# Patient Record
Sex: FEMALE
[De-identification: names, ages, dates, MRNs, and addresses within clinical notes are randomized per-mention and may not be internally consistent; named-entity substitution may affect disease eponyms.]

---

## 2019-02-02 ENCOUNTER — HOSPITAL ENCOUNTER (OUTPATIENT)
Dept: HOSPITAL 92 - ERS | Age: 19
Setting detail: OBSERVATION
LOS: 1 days | Discharge: HOME | End: 2019-02-03
Attending: FAMILY MEDICINE | Admitting: FAMILY MEDICINE
Payer: COMMERCIAL

## 2019-02-02 VITALS — BODY MASS INDEX: 37 KG/M2

## 2019-02-02 DIAGNOSIS — R53.1: Primary | ICD-10-CM

## 2019-02-02 DIAGNOSIS — F41.9: ICD-10-CM

## 2019-02-02 DIAGNOSIS — G35: ICD-10-CM

## 2019-02-02 DIAGNOSIS — Z79.52: ICD-10-CM

## 2019-02-02 DIAGNOSIS — I67.1: ICD-10-CM

## 2019-02-02 DIAGNOSIS — G43.809: ICD-10-CM

## 2019-02-02 LAB
ANION GAP SERPL CALC-SCNC: 13 MMOL/L (ref 10–20)
BASOPHILS # BLD AUTO: 0.1 THOU/UL (ref 0–0.2)
BASOPHILS NFR BLD AUTO: 0.9 % (ref 0–1)
BUN SERPL-MCNC: 9 MG/DL (ref 7–18.7)
CALCIUM SERPL-MCNC: 9.4 MG/DL (ref 7.8–10.44)
CHLORIDE SERPL-SCNC: 106 MMOL/L (ref 98–107)
CO2 SERPL-SCNC: 24 MMOL/L (ref 22–29)
CREAT CL PREDICTED SERPL C-G-VRATE: 0 ML/MIN (ref 70–130)
EOSINOPHIL # BLD AUTO: 0.1 THOU/UL (ref 0–0.7)
EOSINOPHIL NFR BLD AUTO: 0.9 % (ref 0–10)
GLUCOSE SERPL-MCNC: 158 MG/DL (ref 70–105)
HGB BLD-MCNC: 14.3 G/DL (ref 12–16)
LYMPHOCYTES # BLD: 1.6 THOU/UL (ref 1.2–3.4)
LYMPHOCYTES NFR BLD AUTO: 20.4 % (ref 28–48)
MCH RBC QN AUTO: 30.4 PG (ref 25–35)
MCV RBC AUTO: 90.8 FL (ref 78–98)
MONOCYTES # BLD AUTO: 0.2 THOU/UL (ref 0.11–0.59)
MONOCYTES NFR BLD AUTO: 1.9 % (ref 0–4)
NEUTROPHILS # BLD AUTO: 6 THOU/UL (ref 1.4–6.5)
NEUTROPHILS NFR BLD AUTO: 75.9 % (ref 31–61)
PLATELET # BLD AUTO: 266 THOU/UL (ref 130–400)
POTASSIUM SERPL-SCNC: 4 MMOL/L (ref 3.5–5.1)
PREGS CONTROL BACKGROUND?: (no result)
PREGS CONTROL BAR APPEAR?: YES
RBC # BLD AUTO: 4.72 MILL/UL (ref 4–5.2)
SODIUM SERPL-SCNC: 139 MMOL/L (ref 136–145)
WBC # BLD AUTO: 8 THOU/UL (ref 4.8–10.8)

## 2019-02-02 PROCEDURE — 80048 BASIC METABOLIC PNL TOTAL CA: CPT

## 2019-02-02 PROCEDURE — 96372 THER/PROPH/DIAG INJ SC/IM: CPT

## 2019-02-02 PROCEDURE — G0378 HOSPITAL OBSERVATION PER HR: HCPCS

## 2019-02-02 PROCEDURE — 36415 COLL VENOUS BLD VENIPUNCTURE: CPT

## 2019-02-02 PROCEDURE — 96365 THER/PROPH/DIAG IV INF INIT: CPT

## 2019-02-02 PROCEDURE — 84703 CHORIONIC GONADOTROPIN ASSAY: CPT

## 2019-02-02 PROCEDURE — 96366 THER/PROPH/DIAG IV INF ADDON: CPT

## 2019-02-02 PROCEDURE — 85025 COMPLETE CBC W/AUTO DIFF WBC: CPT

## 2019-02-02 PROCEDURE — 99285 EMERGENCY DEPT VISIT HI MDM: CPT

## 2019-02-02 NOTE — PDOC.FPRHP
- History of Present Illness


Chief Complaint: left sided weakness and numbness; trouble walking


History of Present Illness: 








Patient is a 18 year old female with a past history of complex migraines who 

initially presented to [facility] for 2 day history fatigue, left upper 

and extremity weakness/numbness and ataxia. She denies any other neurological 

symptoms. After evaluation at [facility], pt was transferred due to lack of 

availability of neurology for consultation.





She has a prior history of hospitalization for TIA vs. Complex Migraine at 

[facility] in 2015. She states that she had residual left sided 

weakness for several months after that.


ED Course: 


At outside ER, pt received  mg, Ativan 1 mg, Solumedrol 1 g.





- Allergies/Adverse Reactions


 Allergies











Allergy/AdvReac Type Severity Reaction Status Date / Time


 


No Known Allergies Allergy   Verified 02/02/19 05:05














- Home Medications


 











 Medication  Instructions  Recorded  Confirmed  Type


 


No Known  02/02/19 02/02/19 History














- History


PMHx:Complex migraine, Anxiety


 


PSHx: none





FHx: No family history of neurological disease; maternal grandmother with 

history of HTN, Father with DM


 


Social: Pt denies drugs, alcohol, and tobacco use. She is a student at [employer].


 








- Review of Systems


General: reports: fatigue.  denies: fever/chills, weight/appetite/sleep changes


ENT: reports: rhinorrhea.  denies: nasal congestion


Respiratory: denies: shortness of breath


Cardiovascular: denies: chest pain


Gastrointestinal: denies: nausea, vomiting, abdominal pain


Skin: denies: rashes


Musculoskeletal: reports: pain, tenderness


Neurological: reports: numbness, weakness.  denies: syncope, seizure


Psychological: reports: anxiety





- Vital signs


BP: 131/79  HR: 96 RR: 16 Tmax: 98.9 Pox: 96% on RA  Wt: 97 kg





- Physical Exam


Constitutional: NAD, awake, alert and oriented


HEENT: normocephalic and atraumatic, PERRLA, EOMI, grossly normal vision, MMM


Neck: supple


Heart: RRR, normal S1/S2, no murmurs/rubs/gallops, no edema


Lungs: CTAB


Abdomen: soft, non-tender


Neurological: CN II-XII intact, normal sensation, DTRs 2+


-Neurological: 


4/5 strength in left upper and lower extremities. 5/5 in right upper and lower 

extremities


No clonus; negative Babinsnki


some spasticity noted in right upper extremity.





Skin: no rash/lesions


Psychiatric: normal mood and affect





FMR H&P: Results





- Labs


Result Diagrams: 


 02/02/19 02:18





 02/02/19 02:18


Lab results: 


 











WBC  8.0 thou/uL (4.8-10.8)   02/02/19  02:18    


 


Hgb  14.3 g/dL (12.0-16.0)   02/02/19  02:18    


 


Hct  42.8 % (36.0-47.0)   02/02/19  02:18    


 


MCV  90.8 fL (78.0-98.0)   02/02/19  02:18    


 


Plt Count  266 thou/uL (130-400)   02/02/19  02:18    


 


Neutrophils %  75.9 % (31.0-61.0)  H  02/02/19  02:18    


 


Sodium  139 mmol/L (136-145)   02/02/19  02:18    


 


Potassium  4.0 mmol/L (3.5-5.1)   02/02/19  02:18    


 


Chloride  106 mmol/L ()   02/02/19  02:18    


 


Carbon Dioxide  24 mmol/L (22-29)   02/02/19  02:18    


 


BUN  9 mg/dL (7.0-18.7)   02/02/19  02:18    


 


Creatinine  0.76 mg/dL (0.6-1.1)   02/02/19  02:18    


 


Glucose  158 mg/dL ()  H  02/02/19  02:18    


 


Calcium  9.4 mg/dL (7.8-10.44)   02/02/19  02:18    














- Radiology Interpretation


  ** MRI - head


Status: report reviewed by me


Additional comment: 








MRI Brain with and without contrast


- multiple foci of increased T2 white matter signal in the periventricular deep 

white matter bilaterally. There are lesions within the corpus callosum. 

Findings are compatible with demyelinating process, likely multiple sclerosis. 

No enhancing lesions are seen at this time. No evidence of acute infarction or 

acute ischemia. Pansinusitits








MRA of the head without contrast


shows small 2 mm aneurysn along the anterior communicating artery





MRA neck with and without IV contrast


-Right and left common carotids - no hemodynamically significant stenosis


-Right and left internal carotids no hemodynamically significant stenosis


- Right and left vertebral arteries: no hemodynamically significant stenosis. 

No intimal flaps seen. No luminal narrowing.





  ** CT scan - head


Status: report reviewed by me (CT)


Additional comment: 





CT head without contrast


- no acute intracranial findings


-moderate paranasal sinus disease





CTA head and neck


- small dissection flap in left ICA without luminal narrowing.


- otherwise normal CTA of head and neck





FMR H&P: A/P





- Problem List


(1) Multiple sclerosis


Current Visit: Yes   Status: Acute   Code(s): G35 - MULTIPLE SCLEROSIS   





(2) Anxiety


Current Visit: Yes   Status: Acute   Code(s): F41.9 - ANXIETY DISORDER, 

UNSPECIFIED   





(3) Migraine


Current Visit: Yes   Status: Acute   Code(s): G43.909 - MIGRAINE, UNSP, NOT 

INTRACTABLE, WITHOUT STATUS MIGRAINOSUS   





- Plan





Multiple Sclerosis


- will admit patient to medical unit for observation


- new diagnosis, acure flare


- will continue Methylprednisolone 1 g daily with taper.


- likely curbside with neurology in AM.








Anxiety


- pt is not on chronic meds


- will continue to monitor





Migraine


- per patient, mostly controlled with OTC medications.





DVT proph: Lovenox


Disposition/LOS: 





Stable. Length of stay no likely greater than 2 midnights

## 2019-02-02 NOTE — PDOC.FM
- Subjective


Subjective: 


18F, day 1 of hospitalization, her for MS flare. Found at [facility] 2 day prior. 

Transfered here to see neurology. Apparently no neurologist at [facility].





Patient state she feels well today, her weakness in left leg is improving. 

Still has some blurry vision. Specifically denies respiratory distress.





- Objective


MAR Reviewed: Yes


Vital Signs & Weight: 


 Vital Signs (12 hours)











  Temp Pulse Resp BP Pulse Ox


 


 02/02/19 04:55  98.1 F  105 H  16  102/64  96








 Weight











Weight                         94.801 kg














Result Diagrams: 


 02/02/19 02:18





 02/02/19 02:18





Phys Exam





- Physical Examination


Constitutional: NAD


HEENT: moist MMs


Neck: no nodes, supple


Respiratory: no wheezing, no rales, no rhonchi, clear to auscultation bilateral


Cardiovascular: RRR, no significant murmur, no rub


Gastrointestinal: soft, non-tender, no distention, positive bowel sounds


Musculoskeletal: no edema


Neurological: non-focal, moves all 4 limbs


Lymphatic: no nodes


Psychiatric: normal affect


Skin: no rash, cap refill <2 seconds





Dx/Plan


(1) Multiple sclerosis


Code(s): G35 - MULTIPLE SCLEROSIS   Status: Acute   


Plan: 


Currently improving per patient.


Plan to continue steroid, convert to oral today. Consult Neuro for recs.


No respiratory distress. DC plan include having patient walk and confident in 

performing daily activities. 








(2) Anxiety


Code(s): F41.9 - ANXIETY DISORDER, UNSPECIFIED   Status: Acute   


Plan: 


Currently, patient denies anxiety. Advise follow up as outpatient for treatment.








(3) Migraine


Code(s): G43.909 - MIGRAINE, UNSP, NOT INTRACTABLE, WITHOUT STATUS MIGRAINOSUS 

  Status: Acute   


Plan: 


Improved. Patient not having any issue at this moment. Will reevaluate if it 

occurs.

## 2019-02-03 VITALS — SYSTOLIC BLOOD PRESSURE: 106 MMHG | DIASTOLIC BLOOD PRESSURE: 67 MMHG

## 2019-02-03 VITALS — TEMPERATURE: 98.4 F

## 2019-02-03 NOTE — PDOC.FM
- Subjective


Subjective: 





Patient doing well. She reports her strength and blurry vision are all 

improving. She states she has no complaints.





- Objective


MAR Reviewed: Yes


Vital Signs & Weight: 


 Vital Signs (12 hours)











  Temp Pulse Resp BP BP Pulse Ox


 


 02/03/19 07:27  97.7 F  72  19  106/67   95


 


 02/03/19 03:45  97.9 F  81  16   115/70  97


 


 02/03/19 00:30  98.5 F  79  16   133/84  84 L


 


 02/02/19 19:50  98.3 F  83  16   126/77  97








 Weight











Weight                         94.801 kg














I&O: 


 











 02/02/19 02/03/19 02/04/19





 06:59 06:59 06:59


 


Intake Total  250 


 


Balance  250 











Result Diagrams: 


 02/02/19 02:18





 02/02/19 02:18





Phys Exam





- Physical Examination


Constitutional: NAD


HEENT: moist MMs


Neck: no nodes, supple


Respiratory: no wheezing, no rales, no rhonchi, clear to auscultation bilateral


Cardiovascular: RRR, no significant murmur, no rub


Gastrointestinal: soft, non-tender, no distention, positive bowel sounds


Musculoskeletal: no edema


Neurological: non-focal, moves all 4 limbs


5/5 strength in upper and LE


Lymphatic: no nodes


Psychiatric: normal affect, A&O x 3


Skin: no rash





Dx/Plan


(1) Multiple sclerosis


Code(s): G35 - MULTIPLE SCLEROSIS   Status: Acute   


Plan: 


Currently improving per patient.


Plan to continue steroid, convert to oral today. Consult Neuro for recs. Neuro 

indicated yesterday they will decided today for LP or not


No respiratory distress. DC plan include having patient walk and confident in 

performing daily activities. 








(2) Anxiety


Code(s): F41.9 - ANXIETY DISORDER, UNSPECIFIED   Status: Acute   


Plan: 


Currently, patient denies anxiety and no overnight event. Advise follow up as 

outpatient for treatment.








(3) Migraine


Code(s): G43.909 - MIGRAINE, UNSP, NOT INTRACTABLE, WITHOUT STATUS MIGRAINOSUS 

  Status: Acute   


Plan: 


Improved. Patient not having any issue at this moment and has not recurred/. 

Will reevaluate if it occurs.

## 2019-02-03 NOTE — CON
DATE OF CONSULTATION:  02/03/2019



NEUROLOGY CONSULTATION



CONSULTING PHYSICIAN:  [facility].



IMPRESSION:  

1. Probable multiple sclerosis.

2. 2 mm right anterior communicating aneurysm.

3. Small left internal carotid dissection.



PLAN:  

1. IV Solu-Medrol 1 g for 3 days.

2. Medrol Dosepak.

3. Office followup.



HISTORY OF PRESENT ILLNESS:  Ms. Obrien is a 18-year-old  female, college

student, who came in with complaints of recurrent left-sided weakness.  She reports

that when she was 16, she had an episode of left-sided weakness that lasted about 2

weeks.  She was evaluated in [place] and told that she had had a stroke.  She was

started on aspirin, but developed nosebleeds and discontinued it.  Since then, she

has been relatively stable.  She has had some problems with panic and anxiety.  She

has had a tremor in her hands for the last several months.  She came in this time

with recurrent complaints of left-sided weakness and tendency to drag her left leg.

She has also noted some blurred vision in the left eye.  There is some minimal

ongoing headache.  She has a history of migraines also.  She has been started on

steroids.  She was initially worked up at [facility] and had multiple scans

done.  Due to apparent lack of Neurology cover, they shipped her here. 



PAST MEDICAL HISTORY:  Otherwise, negative other than the event 2 years ago.



FAMILY HISTORY:  Unremarkable.



SOCIAL HISTORY:  No tobacco or illicit drug use.



MEDICATIONS:  None.



REVIEW OF SYSTEMS:  Ten-system review of systems is otherwise negative.



PHYSICAL EXAMINATION:

GENERAL:  She is a slightly overweight young woman, in no distress. 

VITAL SIGNS:  Stable.  She is afebrile. 

HEENT:  Pupils are equal and reactive.  Conjunctivae clear.  Oropharynx clear.

Visual acuity is subjectively mildly decreased in the left eye.  There is no color

desaturation reported. 

NECK:  Supple.  No lymphadenopathy. 

EXTREMITIES:  No cyanosis, clubbing, or edema. 

NEUROLOGIC:  She is alert and appropriate.  Her speech is fluent and clear.  Cranial

nerves 2 through 12 are intact.  Otherwise, motor exam showed good strength

bilaterally.  Sensation was intact to light touch.  She had a postural tremor in

both hands.  She can walk independently. 



LABORATORY DATA:  Laboratory studies were reviewed.



SUMMARY:  This is a young woman with apparent multiple white matter lesions reported

on her MRI suggesting multiple sclerosis. 



Imaging was not available for review.  There is some minor endovascular

abnormalities as reported above that probably are insignificant.  I would finish her

steroid treatment and I will follow up with her to start a disease modifying agent

to address her multiple sclerosis. 







Job ID:  [id number]

## 2019-02-04 NOTE — PRG
DATE OF SERVICE:  02/03/2019



ADDENDUM:  Please see note from Dr. Jorge Luis Reynoso, for which I agree.  The patient was

seen, evaluated, examined, and discussed with the residents.  Basically doing a lot

better.  Neurologically starting to get some of her strength back in her left arm,

even through it is not 100%, left leg is lot better as well.  Has been on IV

steroids, and we discussed this with Dr. Howard, Neurology, yesterday and he wants

to see her as an outpatient and feels like since she gets her 3rd day of steroids

she can go and will follow up with him.  The patient asked us about some kind of CT

finding in her neck at [place] [facility] [place], we were able to galvan down those old medical

records, and a CTA showed a questionable dissection flap that is how is described on

the CTA, but then was not seen on MRA on the left side.  Certainly, does not

describe an actual dissection or anything that extreme.  So, it does not sound like

this is something that is acutely major problem, but can run this by Neurology as an

outpatient to get their opinion, if we just need to keep following this. 







Job ID:  [id number]

## 2019-02-04 NOTE — DIS
DATE OF ADMISSION:  02/02/2019



DATE OF DISCHARGE:  02/03/2019



ADMITTING ATTENDING 

Dr. Branden Monroe 



DISCHARGE ATTENDING 

Dr. Branden Monroe



RESIDENT PHYSICIAN

Dr. Jorge Luis Reynoso



CONSULTS:  Dr. Sunny Howard, Neurology.



PROCEDURES:  None.



PRIMARY DIAGNOSES:  

1. Probable multiple sclerosis.

2. A 2-mm right anterior communicating aneurysm.

3. Small left internal carotid dissection.



DISCHARGE MEDICATIONS:  None.



HISTORY OF PRESENT ILLNESS:  Ms. Obrien is a 18-year-old female who was seen

initially at [facility] for recurrent left-sided weakness and 
blurry

vision. She had an MRI done at [facility] where it was found that she has

finding concerning for multiple sclerosis. In addition a CT of her carotid was 
done,

finding a 2-mm right anterior communicating aneurysm and small left internal 
carotid

dissection. She was then transferred to [facility] due to apparent 
lack of

Neurology at [facility]. During her stay here, she was started on 1 gm of

methylprednisolone Q. daily. During her stay, her symptoms gradually resolved. 
On

the 3rd day of treatment, Dr. Sunny Howard saw her. His recommendation was that 
she

follow up in clinic with him for further workup on multiple sclerosis and that 
the small aneurysm and dissection was not of significance but she should also 
follow up with him for that.



DISPOSITION:  Stable.



DISCHARGE INSTRUCTIONS:  Location: To home. 



Diet: As tolerated. 



Activity: As tolerated. 



Followup: Follow up with Dr. Sunny Howard within 2 weeks.







Job ID:  [id number]



MTDD

## 2019-02-04 NOTE — HP
ADDENDUM:  

Please see the history and physical done by Dr. Bee Sousa, for which I agree as

well as the progress note from Dr. Reynoso, for which I agree.  The patient was seen,

evaluated, and discussed with the residents by bedside. 



HISTORY OF PRESENT ILLNESS:  This is a 18-year-old, who comes in with left-sided

weakness, both arm and leg.  Does have history of complicated complex migraines in

the past, but this seems more extreme for even some problems walking and MRI shows

likely multiple foci of increased T2 white matter signal in the periventricular deep

white matter bilaterally compatible with demyelinating processes like a mass and so

is here for that possibility.  She has been given IV Solu-Medrol and her responding

seems to be little better as far as her weakness on the left side. 



PAST MEDICAL HISTORY:  Per the history and physical that was reviewed.



PAST SURGICAL HISTORY:  Per the history and physical that was reviewed.



MEDICATIONS:  Per the history and physical that was reviewed.



ALLERGIES:  PER THE HISTORY AND PHYSICAL THAT WAS REVIEWED.



REVIEW OF SYSTEMS:  Per the history and physical that was reviewed.



PHYSICAL EXAMINATION:

GENERAL:  Exam is completely normal. 

VITAL SIGNS:  Stable.  Affect is normal.  I do not really appreciate any left facial

defects.  Left arm and hand seem maybe detail just a little bit weak compared to the

right and the left leg also has a slightly weak compared to the right. 



ASSESSMENT AND PLAN:  Multiple sclerosis, likely redness by Dr. Howard neurology

on-call and he recommends IV steroids for about 3 days and then outpatient workup

for multiple sclerosis while she is on a Medrol taper.  Discussed this with the

patient. 







Job ID:  [id number]

## 2020-10-30 ENCOUNTER — HOSPITAL ENCOUNTER (INPATIENT)
Dept: HOSPITAL 92 - ERS | Age: 20
LOS: 2 days | Discharge: HOME | DRG: 60 | End: 2020-11-01
Attending: FAMILY MEDICINE | Admitting: FAMILY MEDICINE
Payer: SELF-PAY

## 2020-10-30 VITALS — BODY MASS INDEX: 38 KG/M2

## 2020-10-30 DIAGNOSIS — T38.0X5A: ICD-10-CM

## 2020-10-30 DIAGNOSIS — Z23: ICD-10-CM

## 2020-10-30 DIAGNOSIS — G35: Primary | ICD-10-CM

## 2020-10-30 DIAGNOSIS — Z88.8: ICD-10-CM

## 2020-10-30 DIAGNOSIS — F41.9: ICD-10-CM

## 2020-10-30 DIAGNOSIS — G43.909: ICD-10-CM

## 2020-10-30 DIAGNOSIS — R73.9: ICD-10-CM

## 2020-10-30 LAB
ALBUMIN SERPL BCG-MCNC: 4.2 G/DL (ref 3.5–5)
ALP SERPL-CCNC: 80 U/L (ref 40–110)
ALT SERPL W P-5'-P-CCNC: 14 U/L (ref 8–55)
ANION GAP SERPL CALC-SCNC: 13 MMOL/L (ref 10–20)
AST SERPL-CCNC: 12 U/L (ref 5–34)
BASOPHILS # BLD AUTO: 0.1 THOU/UL (ref 0–0.2)
BASOPHILS NFR BLD AUTO: 1 % (ref 0–1)
BILIRUB SERPL-MCNC: 1.2 MG/DL (ref 0.2–1.2)
BUN SERPL-MCNC: 15 MG/DL (ref 7–18.7)
CALCIUM SERPL-MCNC: 9.1 MG/DL (ref 7.8–10.44)
CHLORIDE SERPL-SCNC: 104 MMOL/L (ref 98–107)
CK SERPL-CCNC: 99 U/L (ref 29–168)
CO2 SERPL-SCNC: 24 MMOL/L (ref 22–29)
CREAT CL PREDICTED SERPL C-G-VRATE: 0 ML/MIN (ref 70–130)
EOSINOPHIL # BLD AUTO: 0.3 THOU/UL (ref 0–0.7)
EOSINOPHIL NFR BLD AUTO: 3 % (ref 0–10)
GLOBULIN SER CALC-MCNC: 2.7 G/DL (ref 2.4–3.5)
GLUCOSE SERPL-MCNC: 126 MG/DL (ref 70–105)
HGB BLD-MCNC: 15.4 G/DL (ref 12–16)
LYMPHOCYTES # BLD: 2.5 THOU/UL (ref 1.2–3.4)
LYMPHOCYTES NFR BLD AUTO: 26.6 % (ref 21–51)
MCH RBC QN AUTO: 32.2 PG (ref 27–31)
MCV RBC AUTO: 91.5 FL (ref 78–98)
MONOCYTES # BLD AUTO: 0.4 THOU/UL (ref 0.11–0.59)
MONOCYTES NFR BLD AUTO: 4.3 % (ref 0–10)
NEUTROPHILS # BLD AUTO: 6 THOU/UL (ref 1.4–6.5)
NEUTROPHILS NFR BLD AUTO: 65.2 % (ref 42–75)
PLATELET # BLD AUTO: 231 THOU/UL (ref 130–400)
POTASSIUM SERPL-SCNC: 3.9 MMOL/L (ref 3.5–5.1)
PREGS CONTROL BACKGROUND?: (no result)
PREGS CONTROL BAR APPEAR?: YES
RBC # BLD AUTO: 4.79 MILL/UL (ref 4.2–5.4)
SODIUM SERPL-SCNC: 137 MMOL/L (ref 136–145)
WBC # BLD AUTO: 9.3 THOU/UL (ref 4.8–10.8)

## 2020-10-30 PROCEDURE — A9579 GAD-BASE MR CONTRAST NOS,1ML: HCPCS

## 2020-10-30 PROCEDURE — 85025 COMPLETE CBC W/AUTO DIFF WBC: CPT

## 2020-10-30 PROCEDURE — 83036 HEMOGLOBIN GLYCOSYLATED A1C: CPT

## 2020-10-30 PROCEDURE — 70450 CT HEAD/BRAIN W/O DYE: CPT

## 2020-10-30 PROCEDURE — 82550 ASSAY OF CK (CPK): CPT

## 2020-10-30 PROCEDURE — 90662 IIV NO PRSV INCREASED AG IM: CPT

## 2020-10-30 PROCEDURE — 96365 THER/PROPH/DIAG IV INF INIT: CPT

## 2020-10-30 PROCEDURE — 90471 IMMUNIZATION ADMIN: CPT

## 2020-10-30 PROCEDURE — G0008 ADMIN INFLUENZA VIRUS VAC: HCPCS

## 2020-10-30 PROCEDURE — 84703 CHORIONIC GONADOTROPIN ASSAY: CPT

## 2020-10-30 PROCEDURE — 93010 ELECTROCARDIOGRAM REPORT: CPT

## 2020-10-30 PROCEDURE — 80053 COMPREHEN METABOLIC PANEL: CPT

## 2020-10-30 PROCEDURE — 96375 TX/PRO/DX INJ NEW DRUG ADDON: CPT

## 2020-10-30 PROCEDURE — 36416 COLLJ CAPILLARY BLOOD SPEC: CPT

## 2020-10-30 PROCEDURE — 70553 MRI BRAIN STEM W/O & W/DYE: CPT

## 2020-10-30 PROCEDURE — 93005 ELECTROCARDIOGRAM TRACING: CPT

## 2020-10-30 PROCEDURE — 36415 COLL VENOUS BLD VENIPUNCTURE: CPT

## 2020-10-30 RX ADMIN — ACETAMINOPHEN, ASPIRIN, CAFFEINE PRN TAB: 250; 65; 250 TABLET, FILM COATED ORAL at 20:31

## 2020-10-30 RX ADMIN — INSULIN LISPRO PRN UNIT: 100 INJECTION, SOLUTION INTRAVENOUS; SUBCUTANEOUS at 20:27

## 2020-10-30 NOTE — CON
NEUROLOGY CONSULTATION



DATE OF CONSULTATION:  10/30/2020



REASON FOR CONSULTATION:  Multiple sclerosis exacerbation.



HISTORY OF PRESENT ILLNESS:  Ms. Maddi Obrien is a 20-year-old female with

history significant for multiple sclerosis, not on any immune modulating agent 
due

to lack of insurance, came to the hospital from a health clinic at a [facility] 
because

of left leg weakness since the last 2 days.  Per the patient, she has difficulty

walking and has been limping.  She also has decreased sensation up to the knee 
of

the left leg.  She also complained of severe migraine with worst pain behind the

right eye, for which she has been taking over-the-counter Excedrin.  She also 
claims

to be very nauseous and did have vomiting.  The patient has been seen by a

neurologist by Dr. Howard as outpatient, but since she lost her insurance, she 
has

not seen a neurologist for more than a year.  Currently, she cannot afford her

medications.  The patient denies double vision, but she does report pain on 
moving

the eyes and also transient loss of vision in the right eye yesterday, which

resolved on its own in the setting of severe headache.  The patient reports 
nausea,

vomiting, headache, episode of loss of vision, and pain on moving the eyes, but

denies problems with speech or swallowing, urinary retention, or urinary

incontinence. 



REVIEW OF SYSTEMS:  All systems were reviewed and were negative except the 
pertinent

positives and negatives mentioned in the HPI. 



PAST MEDICAL HISTORY:  

1. Migraines.

2. Multiple sclerosis.

3. Anxiety.



PAST SURGICAL HISTORY:  None.



SOCIAL HISTORY:  Drinks socially twice a month.  Denies smoking or illegal drug 
use.



FAMILY HISTORY:  No family history of MS.



ALLERGIES:NKDA



 Vital signs

BP: 111/60, HR 70, RR 16, Temp 98.2F, O2 100% on RA, wt 100kg







PHYSICAL EXAMINATION:

Constitutional: NAD, awake, alert and oriented

HEENT: normocephalic and atraumatic, EOMI, no scleral icterus, grossly normal 
vision, grossly normal hearing, MMM

Neck: supple, FROM

Heart: RRR, normal S1/S2, no murmurs/rubs/gallops, pulses present, no edema

Lungs: CTAB, no respiratory distress, no wheezing

Abdomen: soft, non-tender

Musculoskeletal: normal structure, normal tone

Neurological:  Mental status; the patient is alert and oriented to person, 
place, and

time.  Speech is clear.  Recent and remote memory intact.  Fund of knowledge is

appropriate.  Motor; muscle tone and bulk are normal, strength in upper 
extremity

5/5, intention tremor in the right upper extremity, and left lower extremity 
3/5,

right lower extremity 5/5.  Sensory; decreased sensation to light touch in the 
left

leg up to the knee.  Rest is intact.  Gait, deferred due to patient's safety

reasons.  Cerebellar, finger-nose testing intact. 





DATA REVIEWED:  I reviewed the CT scan, which was negative for acute 
intracranial

pathology. 



 

Lab results: 

                                        







WBC  9.3 thou/uL (4.8-10.8)   10/30/20  10:20    

 

Hgb  15.4 g/dL (12.0-16.0)   10/30/20  10:20    

 

Hct  43.8 % (36.0-47.0)   10/30/20  10:20    

 

MCV  91.5 fL (78.0-98.0)   10/30/20  10:20    

 

Plt Count  231 thou/uL (130-400)   10/30/20  10:20    

 

Neutrophils %  65.2 % (42.0-75.0)   10/30/20  10:20    

 

Sodium  137 mmol/L (136-145)   10/30/20  10:20    

 

Potassium  3.9 mmol/L (3.5-5.1)   10/30/20  10:20    

 

Chloride  104 mmol/L ()   10/30/20  10:20    

 

Carbon Dioxide  24 mmol/L (22-29)   10/30/20  10:20    

 

BUN  15 mg/dL (7.0-18.7)   10/30/20  10:20    

 

Creatinine  0.78 mg/dL (0.6-1.1)   10/30/20  10:20    

 

Glucose  126 mg/dL ()  H  10/30/20  10:20    

 

Calcium  9.1 mg/dL (7.8-10.44)   10/30/20  10:20    

 

Total Bilirubin  1.2 mg/dL (0.2-1.2)   10/30/20  10:20    

 

AST  12 U/L (5-34)   10/30/20  10:20    

 

ALT  14 U/L (8-55)   10/30/20  10:20    

 

Alkaline Phosphatase  80 U/L ()   10/30/20  10:20    

 

Creatine Kinase  99 U/L ()   10/30/20  10:20    

 

Serum Total Protein  6.9 g/dL (6.0-8.3)   10/30/20  10:20    

 

Albumin  4.2 g/dL (3.5-5.0)   10/30/20  10:20    









- Radiology Interpretation

  ** CT scan - head

Status: report reviewed by me



  



 



ASSESSMENT AND PLAN:  Ms. Maddi Obrien is a 20-year-old female with history

significant for MS, not on any long-term immunomodulating agent due to 
insurance, presented with

multiple sclerosis exacerbation.  Start on Solu-Medrol 1 g IV every 24 hours for
3-5

days.  Regular insulin sliding scale for steroid-induced hyperglycemia.  Ppi for

steroid-induced gastroesophageal reflux disease.  Neuro checks every 4 hours.  
MRI

of the brain with and without contrast to assess for new active demyelinating

lesions.  PT/OT/Speech.  Continue medical management per primary team.  The plan

discussed in detail with the ED physician and the patient.  We will continue to

follow. 



Thank you for the consult.







Job ID:  [id number]



MARISEL

## 2020-10-30 NOTE — CT
CT HEAD WITHOUT IV CONTRAST



COMPARISON:

 None



HISTORY:

 Altered mental status. History of multiple sclerosis. Left leg weakness and increased headaches with
 nausea.



TECHNIQUE: Axial CT imaging at 5 mm intervals from vertex through skull base without contrast



FINDINGS:

There is no evidence of an acute infarction, hemorrhage, mass effect, or midline shift. The ventricul
ar system is normal in size, shape, and position. 



Skull base has a normal CT appearance.

Mucosal thickening is seen scattered within the ethmoidal air cells bilaterally with trace mucosal th
ickening in the left sphenoid sinus. Mastoid air cells are clear.



Osseous structures appear intact.



IMPRESSION:

1. No acute intracranial abnormality demonstrated.

2. Patient has reported history of multiple sclerosis. MRI brain would be a more sensitive study of tamara
amol for evaluation of demyelinating plaques.



Reported By: Julio Gipson 

Electronically Signed:  10/30/2020 10:45 AM

## 2020-10-30 NOTE — PDOC.FPRHP
- History of Present Illness


Chief Complaint: dizziness and nausea


History of Present Illness: 





Pt is a 21yo female with hx of MS and migraines who presents with nausea, 

dizziness and weakness. On Wednesday started to experience a migraine headache, 

had transient loss of vision in her right eye and had left leg weakness. Left 

leg has "felt heavy" and she was walking with a limp. Yesterday started to have 

nausea, emesis, fatigue, and dizziness. Went to [facility] today for 

dizziness and weakness and was told to go to ED. She was diagnosed with MS in 

Feb 2019. Had been seeing Dr. Howard, neurologist, until about 1 year ago when 

she lost her insurance. She has not been taking medications since then. Denies 

LOC, slurred speech, fever, cough, congestion, SOB.


ED Course: 


1L NS, 1000mg methylprednisolone, benadryl 25mg, reglan 10mg, 1g tylenol








- Allergies/Adverse Reactions


                                    Allergies











Allergy/AdvReac Type Severity Reaction Status Date / Time


 


No Known Allergies Allergy   Verified 02/02/19 05:05














- Home Medications


                                        











 Medication  Instructions  Recorded  Confirmed  Type


 


Aspirin/Acetaminophen/Caffeine 1 each PO Q6HR PRN 10/30/20 10/30/20 History





[Excedrin Migraine Caplet]    














- History


PMHx: MS, migraine w/ aura, anxiety


 


PSHx: none





FHx: none


 


Social: non-smoker, minimal alcohol use, no drug use


 








- Review of Systems


General: denies: fever/chills, weight/appetite/sleep changes


Eyes: reports: vision changes


ENT: denies: nasal congestion, rhinorrhea


Respiratory: denies: cough, congestion, shortness of breath


Cardiovascular: reports: palpitation.  denies: chest pain


Gastrointestinal: reports: nausea, vomiting, diarrhea


Genitourinary: denies: dysuria, polyuria


Skin: denies: rashes, lesions


Musculoskeletal: denies: pain, tenderness


Neurological: reports: numbness, weakness


Psychological: reports: anxiety.  denies: depression





- Vital signs


BP: 111/60, HR 70, RR 16, Temp 98.2F, O2 100% on RA, wt 100kg








- Physical Exam


Constitutional: NAD, awake, alert and oriented


HEENT: normocephalic and atraumatic, EOMI, no scleral icterus, grossly normal 

vision, grossly normal hearing, MMM


Neck: supple, FROM


Heart: RRR, normal S1/S2, no murmurs/rubs/gallops, pulses present, no edema


Lungs: CTAB, no respiratory distress, no wheezing


Abdomen: soft, non-tender


Musculoskeletal: normal structure, normal tone


Neurological: CN II-XII intact


-Neurological: 


 Left leg: decreased sensation, motor 4/5. RUE intention tremor





Skin: no rash/lesions, no jaundice


Heme/Lymphatic: no unusual bruising or bleeding


Psychiatric: normal mood and affect, good judgment and insight, intact recent 

and remote memory





FMR H&P: Results





- Labs


Result Diagrams: 


                                 10/30/20 10:20





                                 10/30/20 10:20


Lab results: 


                                        











WBC  9.3 thou/uL (4.8-10.8)   10/30/20  10:20    


 


Hgb  15.4 g/dL (12.0-16.0)   10/30/20  10:20    


 


Hct  43.8 % (36.0-47.0)   10/30/20  10:20    


 


MCV  91.5 fL (78.0-98.0)   10/30/20  10:20    


 


Plt Count  231 thou/uL (130-400)   10/30/20  10:20    


 


Neutrophils %  65.2 % (42.0-75.0)   10/30/20  10:20    


 


Sodium  137 mmol/L (136-145)   10/30/20  10:20    


 


Potassium  3.9 mmol/L (3.5-5.1)   10/30/20  10:20    


 


Chloride  104 mmol/L ()   10/30/20  10:20    


 


Carbon Dioxide  24 mmol/L (22-29)   10/30/20  10:20    


 


BUN  15 mg/dL (7.0-18.7)   10/30/20  10:20    


 


Creatinine  0.78 mg/dL (0.6-1.1)   10/30/20  10:20    


 


Glucose  126 mg/dL ()  H  10/30/20  10:20    


 


Calcium  9.1 mg/dL (7.8-10.44)   10/30/20  10:20    


 


Total Bilirubin  1.2 mg/dL (0.2-1.2)   10/30/20  10:20    


 


AST  12 U/L (5-34)   10/30/20  10:20    


 


ALT  14 U/L (8-55)   10/30/20  10:20    


 


Alkaline Phosphatase  80 U/L ()   10/30/20  10:20    


 


Creatine Kinase  99 U/L ()   10/30/20  10:20    


 


Serum Total Protein  6.9 g/dL (6.0-8.3)   10/30/20  10:20    


 


Albumin  4.2 g/dL (3.5-5.0)   10/30/20  10:20    














- Radiology Interpretation


  ** CT scan - head


Status: report reviewed by me





  ** MRI - head


Status: image reviewed by me, report reviewed by me





FMR H&P: A/P





- Plan





21yo w/ hx of MS presents with HA, N/V and weakness of L leg





#Acute exacerbation of MS


-dx of MS, currently not treated due to lack of insurance


-Brain CT: no acute process, pending MRI


-neurology consulted from ED: start 1000mg methylprednisolone


- consulted for help with cost of outpatient treatment for MS





#Migraine


-received HA cocktail in ED


-takes excedrin migraine at home


-tylenol prn, excedrin migraine prn





#nausea/vomiting


-zofran prn


-diet as tolerated


-consider IVF if not able to tolerate po





#Anxiety


-aware, patient not on medication





PCP: none


Code: Full


Diet: Regular


IVF: SL


DVT ppx: lovenox





Dispo: Admit inpatient medical, neurology consulted, appreciate recs. LOS >48hrs

















FMR H&P: Upper Level





- Pertinent history





Pt is a 21 yo female with PMH significant for MS, migraines with aura, small 

left interal carotid dissection, 2 mm anterior communicating aneurysm who 

presents with neurological deficits. 2 days ago she had a migraine with sudden 

vision loss but currently does not have vision changes. She has sensory/motor 

changes to L thigh. She denies any other deficits at this time. She endorse n/v 

for the last 2 days as well.  She was diagnosed in 2/2019 and treated until 

10/2019. Has not been on treatment due to lack of insurance. She thinks she was 

treated with tysabri.





Vitals: BP: 111/60, HR 70, RR 16, Temp 98.2F, O2 100% on RA, wt 100kg





Pertinent Physical Exam: 4/5 LLE, decreased sensation to L thigh, CN II-XII 

intact, DAVID





A/P:


# Multiple Sclerosis 


-Neurology consulted in ED


-Pt seen previously by Dr. Howard but pt could not afford medications with lack

of insurance  CM consulted.


-Methylprednisilone 1g IV daily for 3-7 days





# N/V


-Monitor PO intake and start fluids if Zofran does not alleviate nausea 





# Hx of 2 mm R anterior communicating aneurysm


-No evidence of rupture on CT; Follow up in outpt 





# Small Left Internal Carotid Dissection


-Follow up in outpatient





# Hyperglycemia


-Monitor with accuchecks in the setting of high dose steroid use





# Anxiety


-Monitor





VTE: Lovenox


Diet: Regular  change to carb consistent if needed


Fluids: PO


Code: Full


Dispo: Admit to neuro unit for MS Flare, > 48 hrs








- Plan


Date/Time: 10/30/20 1144








I, Aston Preston, have evaluated this patient and agree with findings/plan as 

outlined by intern resident. Pertinent changes/additions are listed here.








Addendum - Attending





- Attending Attestation


Date/Time: 10/30/20 1400





I personally evaluated the patient and discussed the management with Dr. Thompson/Mohan. 


I agree with the History, Examination, Assessment and Plan documented above with

any addition or exceptions noted below.





Patient with history of MS here with suggested MS flare. Neuro consult. MRI read

pending but does appear to have active inflammatory plaques on my review. High 

dose steroids. PT. Anticipate several day hospitalization.

## 2020-10-30 NOTE — MRI
MRI OF THE BRAIN WITH AND WITHOUT IV CONTRAST:

10/30/20

 

HISTORY: 

Altered mental status. Multiple sclerosis. 

 

FINDINGS: 

There are multiple foci of  T2 prolongation in the periventricular subcortical white matter consisten
t with history of multiple sclerosis. There is an enhancing 6 mm plaque involving the subcortical whi
te matter of the left posterior frontal region. No infarct, hemorrhage, mass, midline shift or abnorm
al extra-axial fluid collections are seen. The ventricular size is normal and the basilar cisterns pa
tent. There is mucosal disease in the paranasal sinuses. 

 

IMPRESSION: 

The findings are consistent with multiple sclerosis with a focal actively demyelinating plaque in the
 left posterior frontal lobe. 

 

POS: AH

## 2020-10-31 RX ADMIN — ACETAMINOPHEN, ASPIRIN, CAFFEINE PRN TAB: 250; 65; 250 TABLET, FILM COATED ORAL at 13:40

## 2020-10-31 RX ADMIN — INSULIN LISPRO PRN UNIT: 100 INJECTION, SOLUTION INTRAVENOUS; SUBCUTANEOUS at 12:52

## 2020-10-31 RX ADMIN — METHYLPREDNISOLONE SODIUM SUCCINATE SCH MLS: 1 INJECTION, POWDER, FOR SOLUTION INTRAMUSCULAR; INTRAVENOUS at 08:50

## 2020-10-31 RX ADMIN — INSULIN LISPRO PRN UNIT: 100 INJECTION, SOLUTION INTRAVENOUS; SUBCUTANEOUS at 17:40

## 2020-10-31 RX ADMIN — INSULIN LISPRO PRN UNIT: 100 INJECTION, SOLUTION INTRAVENOUS; SUBCUTANEOUS at 20:05

## 2020-10-31 NOTE — PDOC.NEUPN
- Subjective


Encounter Date: 10/31/20


Subjective: 


Patient feels much better today.  She was able to walk with the help of the 

physical therapist





- Objective


Vital Signs & Weight: 


                             Vital Signs (12 hours)











  Temp Pulse Resp BP BP BP Pulse Ox


 


 10/31/20 12:00  98.0 F  80  20   133/78   96


 


 10/31/20 09:25     112/76   


 


 10/31/20 08:00        98


 


 10/31/20 07:46  98.2 F  62  20   104/67   98


 


 10/31/20 04:00  98.1 F  71  20    124/75  97








                                     Weight











Admit Weight                   222 lb


 


Weight                         222 lb














I&O: 


                                        











 10/30/20 10/31/20 11/01/20





 06:59 06:59 05:59


 


Intake Total  340 


 


Balance  340 











Result Diagrams: 


                                 10/30/20 10:20





                                 10/30/20 10:20


Additional Labs: 


                                   Accuchecks











  10/31/20 10/31/20 10/30/20





  11:56 04:23 19:40


 


POC Glucose  219 H  146 H  237 H











Radiology Reviewed by me: Yes


EKG Reviewed by me: Yes





ROS





- Review of Systems


Constitutional: denies: fever, chills, sweats, weakness, malaise, other


Eyes: reports: pain


ENT: denies: ear pain, ear discharge, nose pain, nose discharge, nose 

congestion, mouth pain, mouth swelling, throat pain, throat swelling, other


Respiratory: denies: cough, dry, shortness of breath, hemoptysis, SOB with 

excertion, pleuritic pain, sputum, wheezing, other


Cardiovascular: denies: no pertinent history, AFIB, CAD, CHF, HTN, MI, Syncope, 

Hyperlipidemia, Mitral valve stenosis, Aortic stenosis, Valve insufficiency, 

Pulmonary hypertension, Other


Gastrointestinal: denies: nausea, vomiting, abdominal pain, diarrhea, 

constipation, melena, hematochezia, other


Genitourinary: denies: dysuria, frequency, incontinence, hematuria, retention, 

other


Musculoskeletal: reports: leg pain, foot pain.  denies: neck pain, shoulder 

pain, arm pain, back pain, hand pain, other


Skin: denies: rash, lesions, curtis, bruising, other


Neurological: reports: weakness.  denies: numbness, incoordination, change in 

speech, confusion, seizures, other


All Systems: All other systems reviewed; all pertinent +/- noted in HPI/Subj





- Medication


Medications: 


Active Medications











Generic Name Dose Route Start Last Admin





  Trade Name Freq  PRN Reason Stop Dose Admin


 


Acetaminophen/Aspirin/Caffeine  1 tab  10/30/20 13:31  10/31/20 13:40





  Aspirin/Apap/Caffeine Tab (Excedrin Migraine)  PO   1 tab





  Q6H PRN   Administration





  Headache  


 


Enoxaparin Sodium  40 mg  10/31/20 09:00  10/31/20 08:50





  Enoxaparin Sodium 40 Mg/0.4 Ml Syringe  SC   40 mg





  0900 JESSY   Administration


 


Methylprednisolone Sodium  116 mls @ 116 mls/hr  10/31/20 09:00  10/31/20 08:50





Succinate 1 gm/ Sodium  IVPB   116 mls





Chloride  DAILY JESSY   Administration


 


Insulin Human Lispro  0 units  10/30/20 20:04  10/31/20 12:52





  Humalog 300 Units/3 Ml Vial  SC   3 unit





  .MILD SLIDING SCALE PRN   Administration





  Mild Correctional Scale  


 


Insulin Human Lispro  0 units  10/30/20 20:04  10/30/20 20:27





  Humalog 300 Units/3 Ml Vial  SC   2 unit





  .BEDTIME SLIDING SC PRN   Administration





  Bedtime Correctional Scale  


 


Ketorolac Tromethamine  15 mg  10/31/20 13:30  10/31/20 13:41





  Ketorolac Tromethamine 30 Mg/Ml Vial  IVP  10/31/20 15:30  15 mg





  NOW JESSY   Administration


 


Pantoprazole Sodium  40 mg  10/31/20 09:00  10/31/20 08:50





  Pantoprazole 40 Mg Tab  PO   40 mg





  DAILY JESSY   Administration














Results





- Labs


Result Diagrams: 


                                 10/30/20 10:20





                                 10/30/20 10:20


Lab results: 


                                        











WBC  9.3 thou/uL (4.8-10.8)   10/30/20  10:20    


 


Hgb  15.4 g/dL (12.0-16.0)   10/30/20  10:20    


 


Hct  43.8 % (36.0-47.0)   10/30/20  10:20    


 


MCV  91.5 fL (78.0-98.0)   10/30/20  10:20    


 


Plt Count  231 thou/uL (130-400)   10/30/20  10:20    


 


Neutrophils %  65.2 % (42.0-75.0)   10/30/20  10:20    


 


Sodium  137 mmol/L (136-145)   10/30/20  10:20    


 


Potassium  3.9 mmol/L (3.5-5.1)   10/30/20  10:20    


 


Chloride  104 mmol/L ()   10/30/20  10:20    


 


Carbon Dioxide  24 mmol/L (22-29)   10/30/20  10:20    


 


BUN  15 mg/dL (7.0-18.7)   10/30/20  10:20    


 


Creatinine  0.78 mg/dL (0.6-1.1)   10/30/20  10:20    


 


Glucose  126 mg/dL ()  H  10/30/20  10:20    


 


Calcium  9.1 mg/dL (7.8-10.44)   10/30/20  10:20    


 


Total Bilirubin  1.2 mg/dL (0.2-1.2)   10/30/20  10:20    


 


AST  12 U/L (5-34)   10/30/20  10:20    


 


ALT  14 U/L (8-55)   10/30/20  10:20    


 


Alkaline Phosphatase  80 U/L ()   10/30/20  10:20    


 


Creatine Kinase  99 U/L ()   10/30/20  10:20    


 


Serum Total Protein  6.9 g/dL (6.0-8.3)   10/30/20  10:20    


 


Albumin  4.2 g/dL (3.5-5.0)   10/30/20  10:20    














- Radiology Interpretation


  ** MRI - head


Status: image reviewed by me, report reviewed by me


Additional Comment: 





MRI of the brain did not reveal acute intracranial pathology.  Active 

demyelinating lesions seen on MRI brain consistent with MS exacerbation





PN A/P





- Plan


Daily Plan: PT/OT, speech therapy, out of bed/ambulate





20-year-old female with history significant for multiple sclerosis presented 

with MS exacerbation characterized by weakness in the lower extremity and 

painful paresthesias in the left lower extremity.  





MRI of the brain reviewed which was consistent with active demyelinating lesions

consistent with multiple sclerosis exacerbation


Continue Solu-Medrol 1 g IV for 3 days.  Today is day #2.


Neurochecks every 4 hours.


Regular insulin sliding scale for steroid-induced hyperglycemia.


PPI for steroid-induced gastroesophageal reflux disease.


Continue home medications.


Consider case management consult regarding insurance issues in obtaining long-

term treatment for multiple sclerosis.


PT/OT


Continue medical management per primary team.


Plan discussed in detail with the patient and the nursing staff.

## 2020-10-31 NOTE — PDOC.FM
- Subjective


Subjective: 


Headache overnight, given toradol which resolved. She has no other complaints 

this morning. She has not had any further vision changes, normal vision at this 

time. She feels weakness has improved, no focal symptoms. She has intention 

tremor of R hand which is chronic and has not worsened per patient.





- Objective


Vital Signs & Weight: 


                             Vital Signs (12 hours)











  Temp Pulse Resp BP BP Pulse Ox


 


 10/31/20 04:00  98.1 F  71  20   124/75  97


 


 10/30/20 23:40  98.4 F  98  18  128/79   98


 


 10/30/20 20:00       98


 


 10/30/20 19:50  99.3 F  110 H  20   122/79  94 L








                                     Weight











Weight                         100.698 kg














I&O: 


                                        











 10/29/20 10/30/20 10/31/20





 06:59 06:59 06:59


 


Intake Total   340


 


Balance   340











Result Diagrams: 


                                 10/30/20 10:20





                                 10/30/20 10:20





Phys Exam





- Physical Examination


Constitutional: NAD


HEENT: moist MMs


Neck: supple


Respiratory: no wheezing, no rales, no rhonchi, clear to auscultation bilateral


Cardiovascular: RRR, no significant murmur


Gastrointestinal: soft, non-tender, no distention, positive bowel sounds


Musculoskeletal: no edema


Neurological: non-focal, moves all 4 limbs


R hand intention tremor


Psychiatric: normal affect, A&O x 3


Skin: no rash





Dx/Plan





- Plan


Plan: 





19yo w/ hx of MS presents with HA, N/V and weakness of L leg





#Acute exacerbation of MS


-dx of MS, currently not treated due to lack of insurance


-Brain CT: no acute process, MRI c/w MS and actively demylinating plaque in L 

posterior frontal lobe


-neurology consulted from ED


- continue methylpredisolone 1 g daily - started 10/30


- consulted for help with cost of outpatient treatment for MS





#Migraine


-received HA cocktail in ED


-takes excedrin migraine at home


-tylenol prn, excedrin migraine prn


- toradol overnight resolved headache





#Hyperglycemia


Likely 2/2 high dose steroids


- continue accuchecks


- consider hga1c if persistent





#nausea/vomiting


-zofran prn


-diet as tolerated


-consider IVF if not able to tolerate po





#Anxiety


-aware, patient not on medication





PCP: none


Code: Full


Diet: Regular


IVF: SL


DVT ppx: lovenox





Dispo: Admit inpatient medical, neurology consulted, appreciate recs. LOS >48hrs








Addendum - Attending





- Attending Attestation


Date/Time: 10/31/20 8236





I personally evaluated the patient and discussed the management with Dr. Landers. 


I agree with the History, Examination, Assessment and Plan documented above with

any addition or exceptions noted below.





Patient reports feeling overall improved, but did have headache overnight. 

Continue IV solumedrol, therapy. Anticipate 2-3 days of hospitalization ahead. 

Neurology on board. CM for medication assistance.

## 2020-11-01 VITALS — TEMPERATURE: 98.5 F

## 2020-11-01 VITALS — DIASTOLIC BLOOD PRESSURE: 74 MMHG | SYSTOLIC BLOOD PRESSURE: 123 MMHG

## 2020-11-01 RX ADMIN — METHYLPREDNISOLONE SODIUM SUCCINATE SCH MLS: 1 INJECTION, POWDER, FOR SOLUTION INTRAMUSCULAR; INTRAVENOUS at 09:55

## 2020-11-01 RX ADMIN — INSULIN LISPRO PRN UNIT: 100 INJECTION, SOLUTION INTRAVENOUS; SUBCUTANEOUS at 06:21

## 2020-11-01 NOTE — PDOC.NEUPN
- Subjective


Encounter Date: 11/01/20


Subjective: 


Patient seems better today and denies any new complaints in the last 24 hours.  

She is receiving her last dose of steroids today.





- Objective


Vital Signs & Weight: 


                             Vital Signs (12 hours)











  Temp Pulse Resp BP Pulse Ox


 


 11/01/20 11:00  98.5 F  57 L  20  110/73  96


 


 11/01/20 07:51  97.9 F  51 L  18  124/85  100








                                     Weight











Admit Weight                   222 lb


 


Weight                         222 lb














I&O: 


                                        











 10/31/20 11/01/20 11/02/20





 07:59 06:59 06:59


 


Intake Total   


 


Balance   











Result Diagrams: 


                                 10/30/20 10:20





                                 10/30/20 10:20


Additional Labs: 


                                   Accuchecks











  11/01/20 11/01/20 10/31/20





  11:18 04:31 19:28


 


POC Glucose  119 H  189 H  229 H














  10/31/20





  16:16


 


POC Glucose  202 H











Radiology Reviewed by me: Yes


EKG Reviewed by me: Yes





ROS





- Review of Systems


Constitutional: denies: fever, chills, sweats, weakness, malaise, other


Eyes: reports: pain.  denies: vision change, conjunctivae inflammation, eyelid 

inflammation, redness, other


Respiratory: denies: cough, dry, shortness of breath, hemoptysis, SOB with 

excertion, pleuritic pain, sputum, wheezing, other


Cardiovascular: denies: no pertinent history, AFIB, CAD, CHF, HTN, MI, Syncope, 

Hyperlipidemia, Mitral valve stenosis, Aortic stenosis, Valve insufficiency, 

Pulmonary hypertension, Other


Gastrointestinal: denies: nausea, vomiting, abdominal pain, diarrhea, 

constipation, melena, hematochezia, other


Genitourinary: denies: dysuria, frequency, incontinence, hematuria, retention, 

other


Musculoskeletal: denies: neck pain, shoulder pain, arm pain, back pain, hand 

pain, leg pain, foot pain, other


Skin: denies: rash, lesions, curtis, bruising, other


Neurological: reports: weakness, numbness.  denies: incoordination, change in 

speech, confusion, seizures, other


All Systems: All other systems reviewed; all pertinent +/- noted in HPI/Subj





- Medication


Medications: 


Active Medications











Generic Name Dose Route Start Last Admin





  Trade Name Freq  PRN Reason Stop Dose Admin


 


Acetaminophen/Aspirin/Caffeine  1 tab  11/01/20 11:24  11/01/20 11:34





  Aspirin/Apap/Caffeine Tab (Excedrin Migraine)  PO   1 tab





  Q6H PRN   Administration





  Headache  


 


Enoxaparin Sodium  40 mg  10/31/20 09:00  11/01/20 09:55





  Enoxaparin Sodium 40 Mg/0.4 Ml Syringe  SC   40 mg





  0900 JESSY   Administration


 


Famotidine  20 mg  11/01/20 11:30  11/01/20 11:34





  Famotidine 20 Mg Tab  PO  11/01/20 13:30  20 mg





  NOW JESSY   Administration


 


Methylprednisolone Sodium  116 mls @ 116 mls/hr  10/31/20 09:00  11/01/20 09:55





Succinate 1 gm/ Sodium  IVPB   116 mls





Chloride  DAILY JESSY   Administration


 


Insulin Human Lispro  0 units  10/30/20 20:04  11/01/20 06:21





  Humalog 300 Units/3 Ml Vial  SC   2 unit





  .MILD SLIDING SCALE PRN   Administration





  Mild Correctional Scale  


 


Insulin Human Lispro  0 units  10/30/20 20:04  10/31/20 20:05





  Humalog 300 Units/3 Ml Vial  SC   2 unit





  .BEDTIME SLIDING SC PRN   Administration





  Bedtime Correctional Scale  


 


Pantoprazole Sodium  40 mg  10/31/20 09:00  11/01/20 09:55





  Pantoprazole 40 Mg Tab  PO   40 mg





  DAILY JESSY   Administration














- Exam


General Appearance: awake alert


Eye: PERRL


ENT: normocephalic atraumatic


Neck: supple


Respiratory: CTAB


Cardiovascular: RRR


Gastrointestinal: soft


Extremities: no cyanosis


Skin: normal turgor


Neurological: CN's grossly intact, no new deficit


Musculoskeletal: no muscle wasting


PSYCH: normal affect, normal behavior, A&O x 3, oriented to person, oriented to 

place, oriented to time





Results





- Labs


Result Diagrams: 


                                 10/30/20 10:20





                                 10/30/20 10:20


Lab results: 


                                        











WBC  9.3 thou/uL (4.8-10.8)   10/30/20  10:20    


 


Hgb  15.4 g/dL (12.0-16.0)   10/30/20  10:20    


 


Hct  43.8 % (36.0-47.0)   10/30/20  10:20    


 


MCV  91.5 fL (78.0-98.0)   10/30/20  10:20    


 


Plt Count  231 thou/uL (130-400)   10/30/20  10:20    


 


Neutrophils %  65.2 % (42.0-75.0)   10/30/20  10:20    


 


Sodium  137 mmol/L (136-145)   10/30/20  10:20    


 


Potassium  3.9 mmol/L (3.5-5.1)   10/30/20  10:20    


 


Chloride  104 mmol/L ()   10/30/20  10:20    


 


Carbon Dioxide  24 mmol/L (22-29)   10/30/20  10:20    


 


BUN  15 mg/dL (7.0-18.7)   10/30/20  10:20    


 


Creatinine  0.78 mg/dL (0.6-1.1)   10/30/20  10:20    


 


Glucose  126 mg/dL ()  H  10/30/20  10:20    


 


Calcium  9.1 mg/dL (7.8-10.44)   10/30/20  10:20    


 


Total Bilirubin  1.2 mg/dL (0.2-1.2)   10/30/20  10:20    


 


AST  12 U/L (5-34)   10/30/20  10:20    


 


ALT  14 U/L (8-55)   10/30/20  10:20    


 


Alkaline Phosphatase  80 U/L ()   10/30/20  10:20    


 


Creatine Kinase  99 U/L ()   10/30/20  10:20    


 


Serum Total Protein  6.9 g/dL (6.0-8.3)   10/30/20  10:20    


 


Albumin  4.2 g/dL (3.5-5.0)   10/30/20  10:20    














- EKG Interpretation


EKG: 





Normal sinus rhythm





- Radiology Interpretation


  ** MRI - head


Status: image reviewed by me, report reviewed by me


Additional Comment: 





MRI of the brain showed active delighted myelinating lesions consistent with 

multiple sclerosis exacerbation





PN A/P





- Plan


Daily Plan: PT/OT, out of bed/ambulate





20-year-old female with history significant for multiple sclerosis presented 

with MS exacerbation characterized by weakness in the lower extremity and 

painful paresthesias in the left lower extremity which are now improved.  





MRI of the brain reviewed which was consistent with active demyelinating lesions

consistent with multiple sclerosis exacerbation


Continue Solu-Medrol 1 g IV for 3 days.  Today is day #3.


Neurochecks every 4 hours.


Regular insulin sliding scale for steroid-induced hyperglycemia.


PPI for steroid-induced gastroesophageal reflux disease.


Continue home medications.


Consider case management consult regarding insurance issues in obtaining long-

term treatment for multiple sclerosis.


PT/OT


Continue medical management per primary team.


Patient stable from neurology perspective.


She is advised to follow-up with Dr. Frey as outpatient for management of 

multiple sclerosis.


Plan discussed in detail with the patient and the nursing staff.

## 2020-11-03 NOTE — DIS
DATE OF ADMISSION:  10/30/2020



DATE OF DISCHARGE:  11/01/2020



RESIDENT:  Sobeida Landers DO



ADMITTING ATTENDING:  Dr. Clifton Dahl.



DISCHARGE ATTENDING:  Dr. Clifton Dahl.



CONSULTS:  Neurology, Dr. Hathaway.



PROCEDURES:  CT of the brain, MRI of the brain.



PRIMARY DIAGNOSIS:  Multiple sclerosis exacerbation.



SECONDARY DIAGNOSES:  Migraines, anxiety, small left internal carotid dissection.



DISCHARGE MEDICATIONS:  Excedrin Migraine one tab q.6 hours p.r.n.



DISCONTINUED MEDICATIONS:  None.



HISTORY OF PRESENT ILLNESS/HOSPITAL COURSE:  This is a 20-year-old female with a

past medical history of multiple sclerosis and migraines who presented with nausea,

dizziness, and weakness, as well as transient loss of vision in the right eye and

left leg weakness.  She was diagnosed with MS in February of 2019.  At that point,

she was seeing Dr. Howard, Neurology until one year ago when she lost her

insurance.  She has not been taking any medications for MS since that time.  She

denies any additional symptoms.  A CT of the brain was performed which revealed no

acute processes.  Subsequently an MRI of the brain was performed which revealed

multiple sclerosis with a focal actively demyelinating plaque in the left posterior

frontal lobe.  She was started on high dose of steroids and Neurology was consulted.

 She was also started on sliding scale insulin for steroid-induced hyperglycemia and

a PPI for steroid-induced reflux disease.  PT, OT, and Speech were consulted.  The

patient did experience headaches while she was present in the hospital.  She was

treated for these presumed migraine headaches with Excedrin, Tylenol and Toradol as

well as Zofran for associated nausea.  After she completed three days of high-dose

steroids, her symptoms were improved.  She was discharged to home in stable

condition.  The patient reports that she does not have insurance.  Case Management

was consulted to help with acquiring resources and she was given information for how

to follow up with a primary care physician such as [facility] or [facility] as

well as information about financial assistance.  It was recommended that she follow

up with Neurology, Dr. Howard within one week of discharge. 



DISPOSITION:  Stable.



DISCHARGE INSTRUCTIONS:  

1. Location:  Home.

2. Diet:  Regular diet.

3. Activity:  As tolerated.

4. Follow up with NeurologyDr. Howard and patient needs to establish care with a

primary care physician.  Information given for [facility], [facility] and [facility]. 







Job ID:  [id number]

## 2020-11-04 NOTE — EKG
Test Reason : 

Blood Pressure : ***/*** mmHG

Vent. Rate : 046 BPM     Atrial Rate : 046 BPM

   P-R Int : 132 ms          QRS Dur : 088 ms

    QT Int : 464 ms       P-R-T Axes : 007 030 010 degrees

   QTc Int : 406 ms

 

Sinus bradycardia

Nonspecific ST abnormality

Abnormal ECG

No previous ECGs available

Confirmed by ARA ZAVALA, DR. FARRELL ([id number]) on 11/4/2020 7:31:48 PM

 

Referred By:  [facility]           Confirmed By:DR. JAMI BULLOCK MD